# Patient Record
(demographics unavailable — no encounter records)

---

## 2025-04-18 NOTE — PHYSICAL EXAM
[General Appearance - Alert] : alert [Ankle Swelling (On Exam)] : not present [Varicose Veins Of Lower Extremities] : not present [] : not present [Delayed in the Right Toes] : capillary refills normal in right toes [Delayed in the Left Toes] : capillary refills normal in the left toes [2+] : left foot dorsalis pedis 2+ [de-identified] : Rectus foot structure is noted off weight bearing. There is tightness of the achilles tendon complex noted bilaterally with a gastrocnemius equinus. On the left, there is mild to moderate focal pain upon palpation along the medial band of the plantar fascia with thickening noted at this focal area of pain, which is located at the level of the midfoot.  [Skin Color & Pigmentation] : normal skin color and pigmentation [FreeTextEntry1] : The nail plates of the 1st and 2nd digits bilaterally are noted to be thickened and dystrophic at the distal 20% of the nail plates. There is no onycholysis and no pain upon palpation or signs of acute infection of the affected nail plates.  [Sensation] : the sensory exam was normal to light touch and pinprick

## 2025-04-18 NOTE — ASSESSMENT
[FreeTextEntry1] : Exam and evaluation were performed.   Radiographs (3v of bilateral foot) were taken and reviewed to show no acute fracture or dislocation. The patient demonstrates pain around the medial band of the plantar fascia with focal thickening about the midfoot. This can be secondary to chronic inflammation, a fibroma or previous partial tearing given her high level of activity. I discussed etiology, treatment options and prognosis with patient. An ultrasound was utilized to visualized the thickened fascia to note nodular thickening around the area of pain.   After further discussion it was agreed to give the patient an injection to reduce inflammation and associated pain and to improve function. The area to be injected was wiped thoroughly with sterile alcohol. Then using a combination of 1.0 cc of Celestone acetate suspension combined with 1 cc of Dexamethasone sodium phosphate and 1.0 cc of Lidocaine plain, an injection was given at the left plantar medial arch region. Sterile gauze was used for compression and then a sterile Band-Aid was applied to the injection site. The possibility of a flair reaction was discussed with the patient prior to administering the injection. Activity modifications were discussed. I also reviewed the role of accommodating shoes and foot orthoses. She already has a night splint which she is to utilize on a consistent basis.   Regarding the nail changes, these dystrophic changes appear to be the result of microtrauma given the symmetry of the nail changes and her level of activity. Management of these symptoms including daily application of urea nail gel was discussed with the patient which she is to apply on a consistent basis. Long term prognosis and anticipated improvement over time was discussed.   All questions were answered to patient's satisfaction. Further treatment will be dictated based on the response to today's treatments. PTR in 2 weeks for follow up and reassessment of her progress.

## 2025-04-18 NOTE — HISTORY OF PRESENT ILLNESS
[FreeTextEntry1] : 40 year old female presents to the office for an initial visit with a chief complaint of multiple issues: 1) she has acute on chronic left plantar medial arch pain for a duration of a year, and 2) she has nail discoloration affecting multiple nail plates of bilateral foot. Regarding the plantar left arch pain, these symptoms have been persistent especially after impact related activity. The patient is an avid  and is very active, with worsening pain and discomfort during and after activity. She attempted some treatment with another podiatrist with oral anti inflammatories and PT but states that these efforts have not been fully effective in alleviating her symptoms. She notices occasional sharp pain depending on her activity level and presents today for an alternate opinion on how to treat this. She has also attempted over the counter orthoses with minimal relief. Regarding the nail discoloration, these symptoms have been present for the past several years (2-3 years) with no improvement. She inquires about management of this as well.

## 2025-05-28 NOTE — PHYSICAL EXAM
[General Appearance - Alert] : alert [General Appearance - In No Acute Distress] : in no acute distress [2+] : left foot dorsalis pedis 2+ [Skin Color & Pigmentation] : normal skin color and pigmentation [Sensation] : the sensory exam was normal to light touch and pinprick [Oriented To Time, Place, And Person] : oriented to person, place, and time [Impaired Insight] : insight and judgment were intact [Ankle Swelling (On Exam)] : not present [Varicose Veins Of Lower Extremities] : not present [] : not present [de-identified] : Forefoot cavus foot type No tenderness medial calcaneal tubercle left foot  There is tenderness at plantar fascia at midfoot level with possible soft tissue fibroma to area.

## 2025-05-28 NOTE — HISTORY OF PRESENT ILLNESS
[FreeTextEntry1] : 40 year old female comes in for a follow up plantar fasciitis she admits continued plantar medial arch pain. Denies any heel pain. States orthotics caused her pain to worsen due to pressure at area. Has been using stretching night splint and has done physical therapy in past.  still having pain pain 4/10  not constant  pain in arch of left foot  no further concerns

## 2025-06-09 NOTE — ASSESSMENT
[FreeTextEntry1] : #Left plantar fasciitis  Plantar fibroma >85% improvement since last visit  Discussed diagnosis and treatment with patient  Discussed etiology of symptoms patient is experiencing  Demonstrated proper stretching techniques with patient showing understanding  continue proper RICE techniques to reduce inflammation and for pain control  continue specific examples of over-the-counter inserts to control heel eversion and support the medial arch Discussed proper shoes (stiff heel counter and midsole with flexion at the 1st MTPJ)   Discussed benefits physical therapy  pending with cutout continue topical diclofenac  MRi reviewed bedside -> plantar fascia intact, sinus tarsi edema, gangion cyst extensor tendon PTR 4-6 weeks if symptomatic

## 2025-06-09 NOTE — HISTORY OF PRESENT ILLNESS
[FreeTextEntry1] : 40 year old female comes in for a follow up plantar fasciitis she admits continued plantar medial arch pain. Patient states she is doing better, 80% improvement since last visit. Patient states she still has some pain when she walks or runs. patient is taking an anti inflammatory for the pain as needed. Patient had a MRI done June 5h 2025

## 2025-06-09 NOTE — PHYSICAL EXAM
[General Appearance - Alert] : alert [General Appearance - In No Acute Distress] : in no acute distress [Ankle Swelling (On Exam)] : not present [Varicose Veins Of Lower Extremities] : not present [] : not present [2+] : left foot dorsalis pedis 2+ [de-identified] : Forefoot cavus foot type No tenderness medial calcaneal tubercle left foot  There is tenderness at plantar fascia at midfoot level with possible soft tissue fibroma to area, it is smaller and less prominent since last visit  [Skin Color & Pigmentation] : normal skin color and pigmentation [Sensation] : the sensory exam was normal to light touch and pinprick [Oriented To Time, Place, And Person] : oriented to person, place, and time [Impaired Insight] : insight and judgment were intact